# Patient Record
Sex: FEMALE | Race: WHITE | Employment: PART TIME | URBAN - METROPOLITAN AREA
[De-identification: names, ages, dates, MRNs, and addresses within clinical notes are randomized per-mention and may not be internally consistent; named-entity substitution may affect disease eponyms.]

---

## 2024-08-16 ENCOUNTER — EMPLOYEE HEALTH (OUTPATIENT)
Dept: OTHER | Facility: HOSPITAL | Age: 49
End: 2024-08-16
Attending: NURSE PRACTITIONER

## 2024-08-16 DIAGNOSIS — Z00.00 WELLNESS EXAMINATION: Primary | ICD-10-CM

## 2024-08-16 PROCEDURE — 86787 VARICELLA-ZOSTER ANTIBODY: CPT

## 2024-08-16 PROCEDURE — 86480 TB TEST CELL IMMUN MEASURE: CPT

## 2024-08-19 LAB
M TB IFN-G CD4+ T-CELLS BLD-ACNC: 0.02 IU/ML
M TB TUBERC IFN-G BLD QL: NEGATIVE
M TB TUBERC IGNF/MITOGEN IGNF CONTROL: >10 IU/ML
QFT TB1 AG MINUS NIL: 0.01 IU/ML
QFT TB2 AG MINUS NIL: 0.01 IU/ML
VZV IGG SER IA-ACNC: 721.9 (ref 165–?)

## 2024-10-03 ENCOUNTER — OFFICE VISIT (OUTPATIENT)
Dept: NEUROLOGY | Facility: CLINIC | Age: 49
End: 2024-10-03
Payer: COMMERCIAL

## 2024-10-03 ENCOUNTER — LAB ENCOUNTER (OUTPATIENT)
Dept: LAB | Age: 49
End: 2024-10-03
Attending: Other
Payer: COMMERCIAL

## 2024-10-03 ENCOUNTER — TELEPHONE (OUTPATIENT)
Dept: NEUROLOGY | Facility: CLINIC | Age: 49
End: 2024-10-03

## 2024-10-03 VITALS
DIASTOLIC BLOOD PRESSURE: 70 MMHG | HEART RATE: 68 BPM | SYSTOLIC BLOOD PRESSURE: 108 MMHG | WEIGHT: 253 LBS | OXYGEN SATURATION: 97 %

## 2024-10-03 DIAGNOSIS — Z13.1 DIABETES MELLITUS SCREENING: ICD-10-CM

## 2024-10-03 DIAGNOSIS — H57.89: ICD-10-CM

## 2024-10-03 DIAGNOSIS — H53.2 DIPLOPIA: Primary | ICD-10-CM

## 2024-10-03 DIAGNOSIS — H53.2 DIPLOPIA: ICD-10-CM

## 2024-10-03 LAB
EST. AVERAGE GLUCOSE BLD GHB EST-MCNC: 103 MG/DL (ref 68–126)
HBA1C MFR BLD: 5.2 % (ref ?–5.7)
THYROGLOB SERPL-MCNC: <15 U/ML (ref ?–60)
THYROPEROXIDASE AB SERPL-ACNC: 34 U/ML (ref ?–60)
TSI SER-ACNC: 1.14 MIU/ML (ref 0.55–4.78)

## 2024-10-03 PROCEDURE — 86376 MICROSOMAL ANTIBODY EACH: CPT

## 2024-10-03 PROCEDURE — 86800 THYROGLOBULIN ANTIBODY: CPT

## 2024-10-03 PROCEDURE — 84443 ASSAY THYROID STIM HORMONE: CPT

## 2024-10-03 PROCEDURE — 36415 COLL VENOUS BLD VENIPUNCTURE: CPT

## 2024-10-03 PROCEDURE — 83036 HEMOGLOBIN GLYCOSYLATED A1C: CPT

## 2024-10-03 PROCEDURE — 99204 OFFICE O/P NEW MOD 45 MIN: CPT | Performed by: OTHER

## 2024-10-03 RX ORDER — MELOXICAM 15 MG/1
15 TABLET ORAL DAILY
COMMUNITY

## 2024-10-03 RX ORDER — BUTALBITAL, ACETAMINOPHEN AND CAFFEINE 50; 325; 40 MG/1; MG/1; MG/1
1 TABLET ORAL EVERY 4 HOURS PRN
COMMUNITY
Start: 2023-09-20

## 2024-10-03 RX ORDER — ALBUTEROL SULFATE 90 UG/1
2 INHALANT RESPIRATORY (INHALATION) EVERY 4 HOURS PRN
COMMUNITY
Start: 2024-07-10

## 2024-10-03 NOTE — PROGRESS NOTES
Neurology History & Physical     ASSESSMENT & PLAN:      ICD-10-CM    1. Diplopia  H53.2 MRI BRAIN+ORBITS (ALL W+WO) (CPT=70553/10651)     Hemoglobin A1C     TSH W Reflex To Free T4     Thyroid peroxidase & thyroglobulin ab      2. 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  Could be inflammatory or thyroid; diabetic less likely.  Concern for progressive structural lesion (symptoms intermittent but worsening over time).  Obtain MRI brain wand orbits w/wo, TSH, Thyroid Ab's, A1c.  I am supportive of her not working until she sees me back in 6 weeks.    We discussed medication side effects and activity precautions. We discussed symptoms that would warrant urgent/emergent evaluation. Patient verbalized understanding and agreement.    Return in about 6 weeks (around 11/14/2024).       ~~~~~~~~~~~~~~~~~~~~~~~~~~~    CHIEF COMPLAINT / REASON FOR VISIT:    Chief Complaint   Patient presents with    Dizziness     Patient states she has been having dizziness with eyes straying to the sides, not together. Has been going on since May.     HISTORY OBTAINED FROM:  Patient and others as above  Chart review    HISTORY:  Octavia Dobson is a 49 year old female with double (and triple) and blurry vision since May 2024, painless, started overnight but is intermittent.  It has become more frequent, now daily or more, and lasts 30-45 minutes.  She is noticing that her left eye or right eye are \"wandering\" - moving on their own.  The double or triple images are always [id number].  Has mild chronic headaches, unchanged, unrelated.      She saw optometrist, and then was seen in [facility] 8/21/24, discharged with plan to see ophtho next day.  She saw ophthalmologist, was recommended to see neuro-ophtho, but that  appointment was supposed to be last week and was canceled.      No limb numbness or weakness.  No hearing changes.  No dizziness, no balance or gait issues, other than when vision is off.      Does  at a factory  Driving status:  NOT Driving    DATA REVIEWED:  As documented in the history    Aug 2024  ER note  CBC, CMP, urine preg unremarkable  Head CT unremarkable    PHYSICAL EXAMINATION:  /70   Pulse 68   Wt 253 lb (114.8 kg)   SpO2 97%     Gen: in NAD  MSE: AAOx3, nl language, nl attn/conc, nl fund of knowledge  CN: PERRL, VFF; EOMI, but both right > left exotropia at rest with horizontal diplopia (though NO diplopia in bilateral extreme gaze), no nystagmus; nl facial mvmt bilaterally; nl hearing bilaterally; nl palate elevation bilaterally; nl voice; nl shoulder shrug b/I; nl tongue movement  Motor: 5/5 x4; no drift; normal tone; no abnormal movements  Sensory: nl vibration x4  Coord: nl FTN b/I  Reflex: 2+ BUE and BLE  Gait: normal    Not on File    Current medications:   albuterol 108 (90 Base) MCG/ACT Inhalation Aero Soln Inhale 2 puffs into the lungs every 4 (four) hours as needed.      butalbital-acetaminophen-caffeine -40 MG Oral Tab Take 1 tablet by mouth every 4 (four) hours as needed.      Meloxicam 15 MG Oral Tab Take 1 tablet (15 mg total) by mouth daily.         History reviewed. No pertinent past medical history.    Past Surgical History:   Procedure Laterality Date    Hc  section level i      1995    Hysterectomy      partial,     Open rx carpal fx Bilateral         Repair bladder wound/inj,complic      2005    Total hip replacement Right            Social History     Socioeconomic History    Marital status: Single   Tobacco Use    Smoking status: Never    Smokeless tobacco: Never   Vaping Use    Vaping status: Never Used   Substance and Sexual Activity    Alcohol use: Not Currently     Comment: social    Drug use: Not Currently   Other  Topics Concern    Caffeine Concern Yes     Comment: coffee 3x daily    Exercise Yes     Comment: physical job   Mother has Graves  No lupus, sarcoid, RA    History reviewed. No pertinent family history.    Sabino Jenkins MD, FAES, FAAN  Board-Certified in Neurology, Epilepsy, and Clinical Neurophysiology  [facility]

## 2024-10-04 ENCOUNTER — HOSPITAL ENCOUNTER (OUTPATIENT)
Dept: MRI IMAGING | Facility: HOSPITAL | Age: 49
Discharge: HOME | End: 2024-10-04
Attending: Other
Payer: COMMERCIAL

## 2024-10-04 ENCOUNTER — TELEPHONE (OUTPATIENT)
Dept: NEUROLOGY | Facility: CLINIC | Age: 49
End: 2024-10-04

## 2024-10-04 ENCOUNTER — MED REC SCAN ONLY (OUTPATIENT)
Dept: NEUROLOGY | Facility: CLINIC | Age: 49
End: 2024-10-04

## 2024-10-04 DIAGNOSIS — H53.2 DIPLOPIA: ICD-10-CM

## 2024-10-04 PROCEDURE — A9575 INJ GADOTERATE MEGLUMI 0.1ML: HCPCS | Performed by: OTHER

## 2024-10-04 PROCEDURE — 70553 MRI BRAIN STEM W/O & W/DYE: CPT | Performed by: OTHER

## 2024-10-04 PROCEDURE — 70543 MRI ORBT/FAC/NCK W/O &W/DYE: CPT | Performed by: OTHER

## 2024-10-04 RX ORDER — DIPHENHYDRAMINE HYDROCHLORIDE 50 MG/ML
10 INJECTION, SOLUTION INTRAMUSCULAR; INTRAVENOUS
Status: COMPLETED | OUTPATIENT
Start: 2024-10-04 | End: 2024-10-04

## 2024-10-04 RX ADMIN — DIPHENHYDRAMINE HYDROCHLORIDE 20 ML: 50 INJECTION, SOLUTION INTRAMUSCULAR; INTRAVENOUS at 23:04:00

## 2024-10-04 NOTE — TELEPHONE ENCOUNTER
Rec'd DOS 10/4/24 consultation appt confirmation; 1/7/2025 with Dr. Paulette Moscoso. Placed in RN bin for p/u.

## 2024-10-05 ENCOUNTER — PATIENT MESSAGE (OUTPATIENT)
Dept: NEUROLOGY | Facility: CLINIC | Age: 49
End: 2024-10-05

## 2024-10-07 NOTE — TELEPHONE ENCOUNTER
From: Octavia Dobson  To: Sabino Jenkins  Sent: 10/5/2024 6:43 AM CDT  Subject: MRI    Good morning!   I had the MRI. If reading the radiologists reports it looks to be ok. I unfortunately can't see the ophthalmologist until Jan.7th @ 740am. Do you think it's ok to work an 6-8 hr shift until then? I can't surrvive being off 3mos.

## 2024-10-08 ENCOUNTER — TELEPHONE (OUTPATIENT)
Dept: NEUROLOGY | Facility: CLINIC | Age: 49
End: 2024-10-08

## 2024-10-08 NOTE — TELEPHONE ENCOUNTER
Sabino Jenkins MD   to Agustina Becerra Nurse       10/8/24 10:37 AM  Let's write a letter saying she may return to work, effective immediately, up to 8 hours a day.    Since I don't have a neurological explanation for her symptoms, we will need eye doctor's opinion on work restrictions in the future.    Thanks    Sabino

## 2024-10-08 NOTE — TELEPHONE ENCOUNTER
Received a return to work authorization form from [email or web address] and will be in nurse bin. Please follow up with patient. Patient would like to work 3 days out of the week 8 hour shifts not her regular 12 hours shifts 3 days out of the week  until her next eye doctor appt.

## 2024-10-08 NOTE — TELEPHONE ENCOUNTER
Patient is requesting restriction to work 3 days per week, 8 hours per day, not her usual 12 hour shifts as her symptoms tend to return working longer shifts. Patient works Sunday night-Wednesday mornings, 12 hour shifts.     Message was left to get patient in sooner to [facility](sched 1/7/25). Ophthalmologist at [facility] cancelled three times on patient and she is unable to be seen there until end of January.    Paperwork placed in provider folder for review. Letter pended in separate encounter.

## 2024-10-15 ENCOUNTER — TELEPHONE (OUTPATIENT)
Dept: NEUROLOGY | Facility: CLINIC | Age: 49
End: 2024-10-15

## 2024-10-15 NOTE — TELEPHONE ENCOUNTER
Disability forms received in the forms department via fax and logged for processing. [employer] message set for Release of Information.

## 2024-10-15 NOTE — TELEPHONE ENCOUNTER
Return to work letter signed by Dr Jenkins and sent to patient via [employer] attachment. Copy sent to scanning. SUNIL has not been obtained from patient.

## 2024-10-16 ENCOUNTER — TELEPHONE (OUTPATIENT)
Dept: NEUROLOGY | Facility: CLINIC | Age: 49
End: 2024-10-16

## 2024-10-16 NOTE — TELEPHONE ENCOUNTER
Called patient for details regarding disability/ Family Medical Leave Act forms. Patient advised she is unsure if forms are needed at this time since she was given a letter and has returned to work. Patient states she has a follow up appointment 11/13/24 and will call forms department if forms are needed at that time. Advised patient forms will be placed on hold at this time.

## 2024-10-25 ENCOUNTER — LAB ENCOUNTER (OUTPATIENT)
Dept: LAB | Age: 49
End: 2024-10-25
Attending: OPHTHALMOLOGY
Payer: COMMERCIAL

## 2024-10-25 DIAGNOSIS — H50.30 UNSPECIFIED INTERMITTENT HETEROTROPIA: ICD-10-CM

## 2024-10-25 DIAGNOSIS — H53.2 DIPLOPIA: Primary | ICD-10-CM

## 2024-10-25 LAB
T3FREE SERPL-MCNC: 3.15 PG/ML (ref 2.4–4.2)
T4 FREE SERPL-MCNC: 1.3 NG/DL (ref 0.8–1.7)
TSI SER-ACNC: 0.76 MIU/ML (ref 0.55–4.78)

## 2024-10-25 PROCEDURE — 86041 ACETYLCHOLN RCPTR BNDNG ANTB: CPT

## 2024-10-25 PROCEDURE — 84443 ASSAY THYROID STIM HORMONE: CPT

## 2024-10-25 PROCEDURE — 36415 COLL VENOUS BLD VENIPUNCTURE: CPT

## 2024-10-25 PROCEDURE — 84439 ASSAY OF FREE THYROXINE: CPT

## 2024-10-25 PROCEDURE — 84445 ASSAY OF TSI GLOBULIN: CPT

## 2024-10-25 PROCEDURE — 83519 RIA NONANTIBODY: CPT

## 2024-10-25 PROCEDURE — 84481 FREE ASSAY (FT-3): CPT

## 2024-10-27 LAB — THY STIM IMMUNO: <0.1 IU/L

## 2024-10-28 LAB — ACHR BINDING ABS: <0.03 NMOL/L

## 2024-11-01 NOTE — TELEPHONE ENCOUNTER
Received return to work authorization form from [employer] for drs signature and placed in bin for review and signature

## 2024-11-08 LAB — MUSK ABS, SERUM: <1 U/ML

## 2024-11-11 NOTE — TELEPHONE ENCOUNTER
Provider requested RN staff call [facility] and request that Dr. Eleno Moseley call provider directly.    Called [facility] and spoke with Francy, gave provider's cell phone number and requested Dr. Moseley call Dr. Jenkins directly.     Left cell phone number with Francy for Dr. Moseley to call provider.

## 2024-11-11 NOTE — TELEPHONE ENCOUNTER
Sabino Jenkins MD  P Agustina [facility] Nurse  I just received return to work form for her.  Can we clarify why she has written \"If I get ill while at work or prior to shift absence will be covered under FMLA\"?  Previously, I advised that since I don't have a neurological explanation for her symptoms, we will need eye doctor's opinion on work restrictions in the future.  So we need their input, otherwise I would release her to return to work with no restrictions.  Has she seen them yet?    Thanks    Sabino

## 2024-11-11 NOTE — TELEPHONE ENCOUNTER
Called patient to ask below questions from Dr. Jenkins.    Pt states she has seen Dr. Jackman neuroophthalmologist, was referred referred to Dr. Eleno Moseley.    Per pt, Dr. Moseley informed that it is \"up to her\" how often she needs to take off FMLA due to symptoms.  Dr. Moseley informed pt  surgeon in Feb 2025.  Give next 2-3 months. Dr. Dalton Jupiter     Pt states Dr. Moseley states pt has a misalignment of Optic nerve and stated \"when it is bad, take your time off\". Dr. Moseley is deferring FMLAto Dr. Jenkins.    Pt states FMLA of 2 times per month for 8 hours per episode would be sufficient for FMLA until follow up with Dr. Moseley in Feb 2025.    Pt has follow up appt with Dr. Jenkins on 11/13/2024.     Routed to provider.

## 2024-11-12 NOTE — TELEPHONE ENCOUNTER
Called patient to discuss below.  Pt reports it was Dr. Jackman that told her she could \"take time off when it is bad\" and she is following up with Dr. Moseley in Feb.      RN discussed with patient that she should discuss FMLA with Dr. Jackman, pt VU.  Pt will discuss further questions with Dr. Jenkins at  on 11/13/2024.         Can we call the patient directly and discuss?  Apparently she is requesting FMLA and I spoke directly with Dr. Moseley, and what the patient report to us is NOT what Dr. Moseley recommended.  Dr. Moseley and myself both agree that her condition does not warrant time off, and that she may work without restrictions, and if not improved within 6 months of symptoms starting, patient should consider surgery with Dr. Moseley.  So I cannot complete the form.    Thanks

## 2024-11-13 ENCOUNTER — OFFICE VISIT (OUTPATIENT)
Facility: CLINIC | Age: 49
End: 2024-11-13
Payer: COMMERCIAL

## 2024-11-13 VITALS
RESPIRATION RATE: 16 BRPM | HEART RATE: 70 BPM | SYSTOLIC BLOOD PRESSURE: 116 MMHG | DIASTOLIC BLOOD PRESSURE: 72 MMHG | WEIGHT: 251 LBS

## 2024-11-13 DIAGNOSIS — D57.3 HEMOGLOBIN S (HB-S) TRAIT (HCC): ICD-10-CM

## 2024-11-13 DIAGNOSIS — H53.2 DIPLOPIA: Primary | ICD-10-CM

## 2024-11-13 PROCEDURE — 99214 OFFICE O/P EST MOD 30 MIN: CPT | Performed by: OTHER

## 2024-11-13 NOTE — PATIENT INSTRUCTIONS
Refill policies:    Allow 2-3 business days for refills; controlled substances may take longer.  Contact your pharmacy at least 5 days prior to running out of medication and have them send an electronic request or submit request through the “request refill” option in your [facility] [address] account.  Refills are not addressed on weekends; covering physicians do not authorize routine medications on weekends.  No narcotics or controlled substances are refilled after noon on Fridays or by on call physicians.  By law, narcotics must be electronically prescribed.  A 30 day supply with no refills is the maximum allowed.  If your prescription is due for a refill, you may be due for a follow up appointment.  To best provide you care, patients receiving routine medications need to be seen at least once a year.  Patients receiving narcotic/controlled substance medications need to be seen at least once every 3 months.  In the event that your preferred pharmacy does not have the requested medication in stock (e.g. Backordered), it is your responsibility to find another pharmacy that has the requested medication available.  We will gladly send a new prescription to that pharmacy at your request.    Scheduling Tests:    If your physician has ordered radiology tests such as MRI or CT scans, please contact Central Scheduling at [phone] right away to schedule the test.  Once scheduled, the [facility] Centralized Referral Team will work with your insurance carrier to obtain pre-certification or prior authorization.  Depending on your insurance carrier, approval may take 3-10 days.  It is highly recommended patients assure they have received an authorization before having a test performed.  If test is done without insurance authorization, patient may be responsible for the entire amount billed.      Precertification and Prior Authorizations:  If your physician has recommended that you have a procedure or additional testing performed the [facility]  Centralized Referral Team will contact your insurance carrier to obtain pre-certification or prior authorization.    You are strongly encouraged to contact your insurance carrier to verify that your procedure/test has been approved and is a COVERED benefit.  Although the [facility] Centralized Referral Team does its due diligence, the insurance carrier gives the disclaimer that \"Although the procedure is authorized, this does not guarantee payment.\"    Ultimately the patient is responsible for payment.   Thank you for your understanding in this matter.  Paperwork Completion:  If you require FMLA or disability paperwork for your recovery, please make sure to either drop it off or have it faxed to our office at [phone]. Be sure the form has your name and date of birth on it.  The form will be faxed to our Forms Department and they will complete it for you.  There is a 25$ fee for all forms that need to be filled out.  Please be aware there is a 10-14 day turnaround time.  You will need to sign a release of information (SUNIL) form if your paperwork does not come with one.  You may call the Forms Department with any questions at [phone].  Their fax number is [phone].      After your visit at the [facility] office  today,  please direct any follow up questions or medication needs to the staff in our  [place] office so that your concerns may be promptly addressed.  We are available through [place] or at the numbers below:    The phone number is:   [phone] option #1    The fax number is:  [phone]    Your pharmacy should also send any requests electronically to the [place] office.

## 2024-11-13 NOTE — PROGRESS NOTES
Neurology History & Physical     ASSESSMENT & PLAN:      ICD-10-CM    1. Diplopia  H53.2       2. 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  I discussed the negative test results with patient.  I also discussed with her, that, per my discussion with Dr. Moseley, this is an exotropia, an ophthalmologic condition for which she may consider surgery if not improved 6 months after onset.  She is understandably frustrated as this affects her work and driving.      However, I advised that I cannot make a recommendation regarding FMLA debi considering we don't have a neurological cause.  Per my discussion with nursing team, apparently patient initially told Adrianne that Dr. Moseley recommended that she can have time off work, but at subsequent conversation patient told Adrianne it was Dr. Jackman who recommended that.  I advised that from a neurological perspective I don't recommend any work or activity restrictions, and as I had previously advised, going forward any restrictions would have to be recommended by her eye doctors or other providers.  At this point she left the office, did not give me opportunity to examine her, and she refused AVS.    Question of HbS.  She is aware she has sickle cell trait, no further workup needed.    Return for concerns as needed.       ~~~~~~~~~~~~~~~~~~~~~~~~~~~    CHIEF COMPLAINT / REASON FOR VISIT:    Chief Complaint   Patient presents with    Neurologic Problem     Dizziness, headaches and double/triple vision still occurring. More frequent and symptoms lasting longer.      HISTORY OBTAINED FROM:  Patient and others as above  Chart review    HISTORY:  Octavia Dobson is a 49 year old female with double (and triple) and blurry vision since May 2024, painless, started overnight but is intermittent.  It has become more frequent, now daily or more, and lasts 30-45 minutes.  She is noticing that her left eye or right eye  are \"wandering\" - moving on their own.  The double or triple images are always [id number].  Has mild chronic headaches, unchanged, unrelated.      She saw optometrist, and then was seen in [facility], discharged with plan to see ophtho next day.  She saw ophthalmologist, was recommended to see neuro-ophtho, but that appointment was supposed to be last week and was canceled.      No limb numbness or weakness.  No hearing changes.  No dizziness, no balance or gait issues, other than when vision is off.      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    DATA REVIEWED:  As documented in the history    Oct 2024  MRI brain + orbits w/wo unremarkable  TSI, TSH, FT4, FT3 unremarkable  AChR binding and MuSK unremarkable   A1c unremarkable but possible HbS noted  TPO, TG unremarkable    Aug 2024  ER note  CBC, CMP, urine preg unremarkable  Head CT unremarkable    PHYSICAL EXAMINATION:  /72   Pulse 70   Resp 16   Wt 251 lb (113.9 kg)     No Known Allergies    Current medications:   albuterol 108 (90 Base) MCG/ACT Inhalation Aero Soln Inhale 2 puffs into the lungs every 4 (four) hours as needed.      butalbital-acetaminophen-caffeine -40 MG Oral Tab Take 1 tablet by mouth every 4 (four) hours as needed.      Meloxicam 15 MG Oral Tab Take 1 tablet (15 mg total) by mouth daily.         History reviewed. No pertinent past medical history.    Past Surgical History:   Procedure Laterality Date    Hc  section level i      1995 1999    Hysterectomy      partial,     Open rx carpal fx Bilateral         Repair bladder wound/inj,complic      2005    Total hip replacement Right            Social History     Socioeconomic History    Marital status: Single   Tobacco Use    Smoking status: Never    Smokeless tobacco: Never   Vaping Use    Vaping status: Never Used   Substance and Sexual Activity     Alcohol use: Not Currently     Comment: social    Drug use: Not Currently   Other Topics Concern    Caffeine Concern Yes     Comment: coffee 3x daily    Exercise Yes     Comment: physical job   Mother has Graves  No lupus, sarcoid, RA    History reviewed. No pertinent family history.    Sabino Jenkins MD, FAES, FAAN  Board-Certified in Neurology, Epilepsy, and Clinical Neurophysiology  [facility]

## 2025-04-06 NOTE — PROGRESS NOTES
HISTORY OF PRESENT ILLNESS  Chief Complaint   Patient presents with    Weight Problem     Pt wants to be healthy. Pt is interested in getting back on wt lose medications. Pt heard about some good feed back about our [facility].       Octavia Dobson is a 50 year old female new to our office today for initiation of medical weight loss program, referred by self.  Patient presents today with c/o excess weight about 15 years ago after last pregnancy.    Reason/goal for weight loss: lose 30# in 6 months and 60# in 1 year.    Previous weight loss efforts in the past: see below    Past 6 months lifestyle interventions: yes, regular exercise    Reviewed [facility] patient contract. Readiness for Lifestyle change: 10/10, Interest in Medication: 10/10, Bariatric surgery interest: 8/10.    Barriers to weight loss: cravings for sweets    Wt Readings from Last 6 Encounters:   04/07/25 266 lb (120.7 kg)   11/13/24 251 lb (113.9 kg)   10/03/24 253 lb (114.8 kg)          Social hx and lifestyle reviewed:    How many meals do you eat out per week: not reported  Who is the primary cook in your home: patient    Please respond to the questions regarding your previous weight loss    How did you hear about the [facility]? Internet/Web Search   Previous weight loss efforts in the past/medication(s): Exercise phentermine, metformin   Eating behaviors/patterns that have been barriers to weight loss success in the past: Not eating out   Please respond to the questions regarding a 24 hour food journal.  Include the average time you ate and the quantity/food preparation method.    List foods, qty and prep for breakfast: Starbucks turkey Sinhala   List foods, qty and prep for lunch. Chicken tacos  from cafe   List foods, qty and prep for dinner.    List foods, qty and prep for snacks. Grapefruit   List the types and qty of fluids consumed 64oz water   Please respond to the questions regarding lifestyle.    Tobacco use: No   Alcohol use: How  many servings per week? 1   Supplements taken on a regular basis include: 0   Please respond to the questions regarding exercise/activity    How many days per week are you active or exercise 3   What type of activities: Work   Perceived level of exertion on a scale of 1-5, with 5 being very intense: 3   Average stress level on a scale of 1-10, with 10 being extremely stressed: 9   How do you cope with stress: Bathing, deserts   Please respond to the questions regarding sleep    How many hours of uninterrupted sleep do you get a night: 4   How many times do you wake up in the night: 3   Do you feel rested in the morning: No   Do you snore: Yes   Do you have sleep apnea: No   Do you use: None     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  Marital status: Single with 3 children    MEDICAL HISTORY  PMH reviewed:   Cardiac disorders: negative  Depression/anxiety: depression and anxiety  Glaucoma: negative  Kidney stones: negative  Eating disorder: negative  Migraines: yes  Seizures: negative  Joint-related conditions: multiple joint pains- chronic  Liver disease: fatty liver  Renal disease: negative  Diabetes: negative  Thyroid disease: negative  Constipation: chronic constipation  Other pertinent hx: n/a  Sleep Apnea hx: negative  Cancer hx: negative  Cholecystectomy and/or gallstones: negative  Family or personal history of Pancreatic issues / Medullary Thyroid Cancer/MENS 2: negative  History of bariatric surgery: negative    FMH reviewed    REVIEW OF SYSTEMS  GENERAL: feels well otherwise  SKIN: denies any rashes to skin folds  HEENT: snoring- yes  LUNGS: denies shortness of breath with exertion, no apnea  CARDIOVASCULAR: denies chest pain on exertion, denies palpitations or pedal edema  GI: denies abdominal pain.  No N/V/D/C  MUSCULOSKELETAL: see above  NEURO: denies headaches  PSYCH: denies change in behavior or mood, denies feeling sad or  depressed.    EXAM  /78   Pulse 79   Resp 16   Ht 5' 9\" (1.753 m)   Wt 266 lb (120.7 kg)   SpO2 98%   BMI 39.28 kg/m² ,   BC not available. WC: 44 inches.  GENERAL: well developed, well nourished, in no apparent distress, obese  SKIN: warm, pink, dry without rashes to exposed area  EYES: conjunctiva pink, sclera non icteric, PERRLA  HEENT: atraumatic, normocephalic, O/p: Mallampati score- 4  NECK: supple, non tender, no adenopathy, no thyromegaly  LUNGS: CTA in all fields, breathing non labored  CARDIO: RRR without murmur, normal S1 and S2 without clicks or gallops, no pedal edema. Reviewed EKG in EMR dated 8/24/24.  GI: +BS, soft, no masses, HSM or tenderness  MUSCULOSKELETAL: grossly intact  NEURO: Oriented times three  PSYCH: pleasant, cooperative, normal mood and affect    No results found for: \"WBC\", \"RBC\", \"HGB\", \"HCT\", \"MCV\", \"MCH\", \"MCHC\", \"RDW\", \"PLT\", \"MPV\"  No results found for: \"GLU\", \"BUN\", \"BUNCREA\", \"CREATSERUM\", \"ANIONGAP\", \"GFR\", \"GFRNAA\", \"GFRAA\", \"CA\", \"OSMOCALC\", \"ALKPHO\", \"AST\", \"ALT\", \"ALKPHOS\", \"BILT\", \"TP\", \"ALB\", \"GLOBULIN\", \"AGRATIO\", \"NA\", \"K\", \"CL\", \"CO2\"  Lab Results   Component Value Date     10/03/2024    A1C 5.2 10/03/2024     No results found for: \"CHOLEST\", \"TRIG\", \"HDL\", \"LDL\", \"VLDL\", \"TCHDLRATIO\", \"NONHDLC\", \"CHOLHDLRATIO\", \"CALCNONHDL\"  Lab Results   Component Value Date    T4F 1.3 10/25/2024    TSH 0.759 10/25/2024     Lab Results   Component Value Date    B12 1,111 (H) 04/07/2025     Lab Results   Component Value Date    VITD 31.3 04/07/2025       Medications Ordered Prior to Encounter[1]    ASSESSMENT  Initial Weight Data and Goal Weight Loss:  Weight Calculations  Initial Weight: 266 lbs  Initial Weight Date: 04/07/25  Today's Weight: 266 lbs  5% Goal: 13.3  10% Goal: 26.6  Total Weight Loss: 0 lbs    Diagnoses and all orders for this visit:    Encounter for therapeutic drug monitoring  -     Vitamin D; Future  -     Vitamin B12; Future  -      Tirzepatide-Weight Management (ZEPBOUND) 2.5 MG/0.5ML Subcutaneous Solution Auto-injector; Inject 2.5 mg into the skin once a week.  -     Tirzepatide-Weight Management (ZEPBOUND) 5 MG/0.5ML Subcutaneous Solution Auto-injector; Inject 5 mg into the skin once a week. Start after completing full 4 weeks on Zepbound 2.5 mg weekly dose.  -     OP REFERRAL TO DIAGNOSTIC SLEEP STUDY    Class 2 severe obesity with serious comorbidity and body mass index (BMI) of 39.0 to 39.9 in adult, unspecified obesity type (HCC)  - Start Zepbound as directed  - Reviewed balanced plate nutrition with focus on whole food, regular meals daily that include protein and produce and eliminating/reducing late night eating.  - Counseled on the 4 Pillars of health (sleep, stress, nutrition and fitness).  - Reviewed weight synopsis in EMR   -     Vitamin D; Future  -     Vitamin B12; Future  -     DIETITIAN EDUCATION INITIAL, DIET (INTERNAL)  -     Tirzepatide-Weight Management (ZEPBOUND) 2.5 MG/0.5ML Subcutaneous Solution Auto-injector; Inject 2.5 mg into the skin once a week.  -     Tirzepatide-Weight Management (ZEPBOUND) 5 MG/0.5ML Subcutaneous Solution Auto-injector; Inject 5 mg into the skin once a week. Start after completing full 4 weeks on Zepbound 2.5 mg weekly dose.  -     OP REFERRAL TO DIAGNOSTIC SLEEP STUDY  -     OP REFERRAL TO DIETITIAN [facility] ([facility] USE ONLY)    Fatty liver  -     Vitamin D; Future  -     Vitamin B12; Future  -     DIETITIAN EDUCATION INITIAL, DIET (INTERNAL)  -     Tirzepatide-Weight Management (ZEPBOUND) 2.5 MG/0.5ML Subcutaneous Solution Auto-injector; Inject 2.5 mg into the skin once a week.  -     Tirzepatide-Weight Management (ZEPBOUND) 5 MG/0.5ML Subcutaneous Solution Auto-injector; Inject 5 mg into the skin once a week. Start after completing full 4 weeks on Zepbound 2.5 mg weekly dose.  -     OP REFERRAL TO DIAGNOSTIC SLEEP STUDY  -     OP REFERRAL TO DIETITIAN [facility] ([facility] USE ONLY)    Hypertension,  unspecified type  -     Vitamin D; Future  -     Vitamin B12; Future  -     DIETITIAN EDUCATION INITIAL, DIET (INTERNAL)  -     Tirzepatide-Weight Management (ZEPBOUND) 2.5 MG/0.5ML Subcutaneous Solution Auto-injector; Inject 2.5 mg into the skin once a week.  -     Tirzepatide-Weight Management (ZEPBOUND) 5 MG/0.5ML Subcutaneous Solution Auto-injector; Inject 5 mg into the skin once a week. Start after completing full 4 weeks on Zepbound 2.5 mg weekly dose.  -     OP REFERRAL TO DIAGNOSTIC SLEEP STUDY  -     OP REFERRAL TO DIETITIAN [facility] ([facility] USE ONLY)    Osteoarthritis of multiple joints, unspecified osteoarthritis type  -     Vitamin D; Future  -     Vitamin B12; Future  -     DIETITIAN EDUCATION INITIAL, DIET (INTERNAL)  -     OP REFERRAL TO DIAGNOSTIC SLEEP STUDY  -     OP REFERRAL TO DIETITIAN [facility] ([facility] USE ONLY)    Migraine without status migrainosus, not intractable, unspecified migraine type  -     Vitamin D; Future  -     Vitamin B12; Future  -     DIETITIAN EDUCATION INITIAL, DIET (INTERNAL)  -     OP REFERRAL TO DIAGNOSTIC SLEEP STUDY  -     OP REFERRAL TO DIETITIAN [facility] ([facility] USE ONLY)    Snoring  -     OP REFERRAL TO DIAGNOSTIC SLEEP STUDY    Disordered sleep  -     OP REFERRAL TO DIAGNOSTIC SLEEP STUDY        PLAN  Medication use and side effects reviewed with patient.  Medication contraindications: none foreseen  Follow up with dietitian and psychologist as recommended.  Discussed the role of sleep and stress in weight management.  Labs orders as above.  Counseled on comprehensive weight loss plan including attention to nutrition, exercise and behavior/stress management for success. See patient instruction below for more details.  Reviewed previous labs in EMR/Care Everywhere  Weight Loss Contract reviewed and signed.    Patient Instructions   Welcome to the [employer] Weight Management Program...your Lifestyle Renovation begins now!  Thank you for placing your trust in our health care  team, I look forward to working with you along this journey to better health!    Next steps:     1.  Call our office at [phone] to schedule a personal nutrition consultation with one of our registered dieticians, Kt or Mariangel. Bring along your food journal (3 days minimum). See journal options below.  2.  Complete non fasting labs at [facility] lab site prior to next office visit. Lab results will be communicated via [email or web address].  3.  Complete additional testing as ordered: Sleep study.  4.  Fill your prescribed medication and take as discussed and prescribed: Start Zepbound at 2.5 mg weekly. After 4 weeks increase to the next dose of 5 mg weekly. If at a weight plateau for >3 weeks, then send [email or web address] message with current scale weight to determine if a dose adjustment is appropriate. Otherwise plan to maintain this dose until next visit. Any further dose titrations beyond this dose will be considered at appointments only, unless otherwise discussed. Visit the website www.[email or web address].[email or web address] and click on Consumers for additional details, savings, and further dosing instructions. This medication may require a prior authorization (PA) by your insurance. A PA may take one week plus to complete and our office will be in touch during this process if needed.    Tips while taking an injectable medication:    Be an intuitive eater. Listen to your hunger and fullness signals, stopping when you are full.  Consume protein and produce in your day, striving for a rainbow of color of produce.  Reduce portions to starting size of 1 cup and check in with your gut to see if you are full. Use a sand timer to slow down your eating pace to allow for 15-20 minutes to complete a meal and use the \"2 bite rule\".  Reduce refined sugars and high fat foods, as they may contribute to greater side effects of nausea and heartburn.  Stop eating 3 hours before bedtime to allow your food to digest.  Remain hydrated with water or non caloric and  non caffeine beverages.  Use over the counter adriana lozenge/supplement to help reduce nausea if needed.  If you have been off your medication for more than 2 weeks please notify our office to determine next dosing, as a return to previous dose may not be appropriate or tolerated.    Please try to work on the following dietary changes this first month:    1.  Drink water with meals and throughout the day, cut down on soda and/or juice if consumed. Consider flavored water options like Bubbly, Spindrift, Hint and Brenda. Reduce alcohol servings to 4 per week maximum.  2.  Have protein with each meal, examples include: greek yogurt, cottage cheese, hard boiled egg, tofu, chicken, fish, or tuna.   3.  Work towards reducing/eliminating refined carbohydrates and sugars which includes items such as sweets, as well as rice, pasta, and bread and make sure to choose whole grain options when having them with just 1 serving per meal about the size of your inner palm.  4.  Consume non starchy veggies daily working towards making them a good 50% of your daily food intake. Add them to lunch and dinner consistently.  5.  Start a daily probiotic: VSL#3 is recommended, (order on line at www.vsl3.com). Take 1 capsule daily with water for 30 days, then reduce to 1 every other day (this will reduce the cost). Capsules can be left out of refrigerator for 2 weeks. I recommend using a pill box weekly and keeping the bottle in the fridge.    Please download tatum My Fitness Pal, LoseIt! Or My Net Diary to monitor daily dietary intake and you will be able to see if you are eating the right amount of calories or too much or too little which would hinder weight loss. Additionally this will help to see your daily carbohydrate and protein intake. When you set the tatum up choose 1.5 lbs/week as a goal.  Keeping a paper food journal is an option as well to remain accountable for your choices- this is the start to mindful eating! A low calorie diet  has been consistently shown to support weight loss.    Continue or start exercising to help establish a routine. If not already exercising begin with 1 day/week and progress as able with the goal of working towards 30 minutes 5 days a week at a minimum. A variety or cardio, strength and stretching is important. Review resources below to help support you in building this healthy routine.    Meditation daily can help manage and control stress. Chronic stress can make weight loss difficult.  Exercising is one way to help with stress, but meditation using the CALM Clemente or another comparable alternative can be done in your home or place of work with little time commitment. This Clemente can also help work on behavior change and improve sleep. Check out the segment under Calm Masterclass and listen to The 4 Pillars of Health. A great way to begin learning about the foundation of lifestyle with practical tips to use in your every day. In addition, we offer counseling services and support for individual connection and care. A referral is necessary so please let me know if this is a service you are interested.    Check out www.yourCSDNtters.org blog for continued support and education along your weight loss journey to optimal health!      Patient Resources:    Personal Training/Fitness Classes/Health Coaching    [facility] in [place]: Full fitness center with group fitness and personal training located in [place].  Health Coaching with Luisa Guy, Sammy Gallagher, and Alvaro Coker at our [facility]- individual coaching to work on your health goals. Call [phone] and/or email @ [email or web address]. Free 60 minute consult when client of [employer] [facility].  LIZABETH Becerra @ [email or web address]. A variety of group fitness options plus various yoga classes [phone] and/or email Lyly at [email or web address]  [facility] Fitness Centers with multiple  locations: Onavo (www.Galera Therapeutics.Marerua Ltda), F45 Training (www.q53veqmpqjo.Marerua Ltda), Fit Body Bootcamp (www.Accounting SaaS JapanbodybootData Elitep.Marerua Ltda), Speed Commerce (www.Tabber.Marerua Ltda), The Exercise  (www.exercisecoach.com), Club Pilates (www.clubCreditPing.com.Marerua Ltda)    Online Fitness  Fitness  on Utube  Fit in 10 DVD series   www.pafih01ZHTUi Link  Chair exercises via Sit and Be Fit (www.sitandbefit.org) and [employer] (www.Referral.IM.com) or Osvaldo Henriquez or Porfirio Carey videos on YouTube.  Hip Hop Fit with Anthony Rivera at www.hiphopfit.IntellectSpace    Apps for on the Go Fitness  [place] 7 Minute Workout (orange box with white 7) - free on the go HIIT training clemente  Peloton Clemente @ www.onepeloton.com    Nutrition Trackers, Meal Preparation, and Other Meal Programs  LoseIT! And My Fitness Pal apps and on line for tracking nutrition  NOOM - virtual health coaching  FitFoundation (healthy meals on the go) in [place] @ www.nhttyriwsbiaf1zUi Link  Bistro MD @ wwwLocal Corporationbistromd.Marerua Ltda and Ogxiga46 (calorie smart and low carb plans recommended) @ www.aqbrtp50.com, Metabolic Meals @ www.MyMetabolicMeals.com - individual prepared meals to go  Gobble, Blue Apron, Home , Every Plate, Sunbasket- on line meal delivery programs for preparation at home  Meal Village in [place] for homemade meals to go @ www.mealvillage.com  Diet Doctor @ www.dietdoctor.com - low carb swaps  ReciMe and Mealime clemente (grocery and meal planning)    Stress, Anxiety, Depression, Trauma  CALM meditation clemente (www.calm.com)  Headspace  Don't let anxiety run your life. Using the science of emotion regulation and mindfulness to overcome fear and worry by Carlos Myles PsyD and Jose Robertson MA.  The Graceful Tables Podcast (September 27, 2023): 6 Magic Words That Stop Anxiety  What Happened to You?- a look at the impact trauma has on behavior written by Scott Palacios and Dr. Shekhar Pearl  Whole Again by Adam Mena - discovering your true self after  trauma    Mindful Eating/The Hungry Brain  Am I Hungry? Mindful eating virtual  tatum (www.amihungry.com)  The Hungry Brain by Alice Erazo, PhD  Mindless Eating by Dallas Otto  Weight Loss Surgery Will Not Treat Food Addiction by Shagufta Wolfe Ph.D    Metabolic Dysfunction, Hormones and Cravings  Why We Get Sick? By Donnie Merchant (insulin resistance)  Your Body in Balance: The New Science of Food, Hormones, and Health by Dr. Tripp Zhao  The Complete Guide to fasting by Dr. Ray  Fast Like a Girl by Dr. Barbi Pang  The M Factor (documentary on PBS about Menopause)  Sugar, Salt & Fat by Thu Zacarias, Ph.D, R.D.  The Truth About Sugar - documentary on sugar (Free on ADIKTIVO, https://Slicethepie.be/4F9mokfGC8g)  Presentation on SUGAR called Sugar: The Bitter Truth by Dr. Chidi Marie (ADIKTIVO) https://Slicethepie.be/dBnniua6-oM?si=ujmzb3dlk7nr8dyj  Reverse Visceral Fat: #1 Way to Increase Your Lifespan & End Inflammation with Dr. Pradeep Stover on Utube @ https://Slicethepie.be/nupPRnvUpJY?si=tr0zuwDbZGA0MsxM    Nutrition Support  You Are What You Eat - Netfix series on twin study looking at impact of nutrition changes on health  The End of Dieting: How to Live for Life by Dr. Bairon Howe M.D. or listen to The AdelaVoice Podcast Episode 63: Understanding \"Nutritarian\" Eating w/Dr. Bairon Howe  The Game Changers- Netflix Documentary on plant based nutrition  The Dr. Holt T5 Wellness Plan by Dr. Albino Holt MD  The Complete Guide to fasting by Dr. Ray  @[facility] ([facility] Dietician with support surrounding nutrition and meal prep/planning)    Education, Motivation and Support Resources  Live to 100: Secrets of the Blue Zones - Netflix series on the secrets to communities living over 100 years old  Atomic Habits by Sunil Davalos (a book about taking small steps to promote greater behavior change)   Motivation tatum (black box with white \")- daily supportive messages sent to your phone  Can't Hurt Me by Carlos Matt (a  book exploring the power of discipline in achieving your goals)  Fed Up - documentary about obesity (Free on Utube)  Www.yourweightmatters.org - Obesity Action Coalition sponsored Blog posts  Obesity Action Coalition Resources on topics specific to weight management (www.obesityaction.org)  Fitlosophy Fitspiration - journal to better health (journal book found at [employer] in fitness aisle)  Tulio Hernandez talk titled: The Call to Courage (Netflix)  The Exam Room by the Physician's Committee (Podcast)  Nutrition Facts by Dr. Madsen (Podcast)    Balanced Nutrition includes:     Build the mentality of Food 4 Fuel. Clean eating with whole foods and eliminating/reducing ultra processed foods.  Be an intuitive eater and using mindful eating practices.  Eat a balanced plate with protein and produce at all meals: 1/4 plate- protein, 1/2 plate non starchy veggies, and 1/4 plate fruit or complex carbohydrate.  Drink water with all meals and use a salad plate to naturally reduce portions.  Eliminate/reduce late night eating by stopping after 7pm. Allowing your body to fast for 12 hours (drink only water, tea or black coffee without any additives).            Healthy Tips for Eating Out  Choices  It’s not easy to change the habits of a lifetime. Experts say that it can take 6 months just to change one old habit for a healthier one. That’s why gradual change is so important. These tips are reminders of the dozens of small ways you can change your habits when eating out. Don’t expect to follow each tip all the time. Think of the tips as options for handling situations that may have given you trouble in the past.  You don't have to give up eating out to cut down on fat, cholesterol, and salt. You just need to think about what you order. Many menus highlight low-fat and low-sodium dishes. But if you can't find what you want, ask. Explain what you need to the  or . Or ask to see printed nutrition information.    Ask for  what you want  Ask that foods be prepared with little or no fat and with no added salt.  Ask that sauces be left off or served on the side. Choose sauces made with tomato instead of with cream or cheese.  Ask for steamed rice or a baked or boiled potato, without butter or sour cream.  Ask that vegetables be steamed and served with no butter or sauce. Ask for lemon juice or vinegar to sprinkle on them for flavor.  Keep these tips in mind  Choose minestrone or vegetable soups. Ask about sodium content.  Order salad dressing on the side. Dip your fork in the dressing, then in the salad.  Look for fish, chicken, turkey, or meat that is broiled, roasted, poached, or steamed.  Order 1 or 2 low-fat appetizers or soup and a salad instead of a main dish. Or eat only half of the main dish and take the rest home.  If you want a dessert, try fresh fruit, nonfat yogurt, or sorbet. Or share a dessert.  Fast food tips  Choose grilled chicken sandwiches and plain hamburgers. Add vegetables to your burgers and sandwiches and go lightly on creamy sauces like mayonnaise and tartar sauce. Choose whole-grain buns or bread when available.  Remove the skin from fried chicken and choose mashed potatoes, corn on the cob, and baked beans on the side.  Order a broiled chicken salad and pile on fresh vegetables from the salad bar. Use a small amount of low-fat dressing.  Top a baked potato with cottage cheese and vegetables from the salad bar.  Ask for a pizza topped with vegetables.  Watch portion size. Order regular size meals, not super-sized. A kid's meal may be enough and may have healthier options for sides.  Stay away from milkshakes, sugary sodas, and sweetened drinks. Instead choose low-fat or skim milk, water, unsweetened ice tea, and sparkling water.  Restaurant tips  Plan ahead. Decide what you will eat before you get to the restaurant. If you plan to eat high-fat foods, choose low-fat foods during the rest of the day.  Don’t be  afraid to ask how foods are prepared and whether they can be done without high-fat ingredients, such as cream, butter, cheese, and oil.  Ask for sauces and salad dressings on the side and use just a tablespoon. Ask for low-fat dressings.  Try starting your meal with a bowl of vegetable soup or a salad. This may help to prevent you from overeating during the meal.  Order meat, poultry, and fish broiled, grilled, baked, poached, or steamed. Always remove the skin from chicken.  Choose Mexican dishes made with soft, rather than crispy tortillas. For toppings, use salsa and lettuce, rather than sour cream and cheese.  For dessert, enjoy fruit, sorbet, or low-fat frozen yogurt. Share a rich dessert with friends.  Make a meal out of a low-fat appetizer (such as shrimp cocktail or fresh pasta), bread, and salad as your main meal. Ask for an appetizer-size portion of an entree.    Foods to avoid  Donuts, muffins, and pastries  Coconut, vegetables with butter, cream, or cheese sauce  Cream, whole milk, and powdered creamers  Vu, liver, luncheon meats, ground meat, and canned fish in oil  Sweets and foods made with butter, coconut or palm oil, or hydrogenated fats    © 6782-7655 [employer]. [address] [place] [address], [place] [address]. All rights reserved. This information is not intended as a substitute for professional medical care. Always follow your healthcare professional's instructions.      Why Do We Experience Hunger and Have Cravings?  December 18, 2020  Posted in Blog, Nutrition  By [employer]    Hunger and cravings  Hunger and cravings are two key factors that drive our decision to eat and influence our food choices. They are feelings and experiences that we get as a result of the complex signaling between our stomach and brain:    Gut-brain hunger signals (when your stomach tells your brain that you are full or hungry)  The brain reward process (feeling pleasure or silke from  food)  Cognitive control processes (conscious thoughts, feelings, mindset, decisions, etc.)    The Role of Biology  Many people who struggle with weight have biological factors at play that can affect their relationship with food and food-related decisions. No two people experience hunger and respond to it the same way. It is important that the role of biology in hunger is more widely recognized to help others understand how complicated weight issues can be.    Gut-Brain Hunger Signals  Why do we get hungry? Your digestive tract has receptors that can detect when nutrients are present. When you don’t have enough nutrients, these receptors tell your stomach to produce hunger hormones that send a signal to your hypothalamus - the part of your brain that controls hunger and thirst. As a result, you get hungry and want to eat.    When we gain weight, our brains become less sensitive to signals that control how responsive our brain is to nutrients. For example, someone at a higher weight may not have as much leptin (the fullness hormone) and is more likely to feel hungry. That’s why some people affected by obesity may be more biologically likely to struggle with overeating.    Food is Rewarding  Another key element of hunger is psychology. Food is very rewarding and its effects on the brain can make it hard to resist temptation when environmental cues are everywhere. This can also cause you to eat even when you’re not hungry.    Here are two key parts of the brain’s reward system:    The Endocannabinoid System - A system of signaling molecules whose main function is to help the body maintain homeostasis (a stable internal state). It affects mood, appetite, stress, sleep and more.    The Opioid System - A system of the brain that controls pain, reward and addicting behaviors (like stress-eating or binge eating).  Both of these systems can have powerful effects on your desire for food, your cravings, and your enjoyment of  food. When we eat foods that are high in fat, sugar and salt, these systems increase activity in the brain that ramps up your desire to eat.    Cognitive Control  The third key element of hunger and cravings involve your conscious control. Cognitive control helps you decide when not to respond to food cues and hunger signals. However, it can be tough to manage when you are really hungry, stressed or upset in any way.    When we are in a better mood and not starving, it’s easier to ignore food cues and cravings. When we are stressed or depressed, we are more likely to eat based on our feelings. This is called emotional eating or stress eating.    Managing Hunger and Cravings  So, what does all this mean and how can you manage hunger and cravings? Check out the full article (https://www.obesityaction.org/community/hunger-why-do-we-have-cravings-and-what-can-jw-cr-sxpwp-them/) from the [employer],  of the [employer].             Return in about 4 months (around 8/7/2025) for weight management via clinic or Telemedicine Visit and in clinic in October.    Patient verbalizes understanding.    Anyi Gregorio, RHIANNA  4/6/2025    DOCUMENTATION OF TIME SPENT: Code selection for this visit was based on time spent : 60 minutes on date of service in preparing to see the patient, obtaining and/or reviewing separately obtained history, performing a medically appropriate examination, counseling and educating the patient/family/caregiver, ordering medications or testing, referring and communicating with other healthcare providers, documenting clinical information in the electronic medical record, independently interpreting results and communicating results to the patient/family/caregiver and care coordination with the patient's other providers.         [1]   Current Outpatient Medications on File Prior to Visit   Medication Sig Dispense Refill    albuterol 108 (90 Base) MCG/ACT Inhalation Aero  Soln Inhale 2 puffs into the lungs every 4 (four) hours as needed.      lubiprostone 24 MCG Oral Cap Take 1 capsule (24 mcg total) by mouth 2 (two) times daily.      sertraline (ZOLOFT) 50 MG Oral Tab Take 1 tablet (50 mg total) by mouth every morning. 30 tablet 0    propranolol 10 MG Oral Tab Take 1 tablet (10 mg total) by mouth 2 (two) times daily as needed. 30 tablet 0    butalbital-acetaminophen-caffeine -40 MG Oral Tab Take 1 tablet by mouth every 4 (four) hours as needed.      Meloxicam 15 MG Oral Tab Take 1 tablet (15 mg total) by mouth daily.       No current facility-administered medications on file prior to visit.

## 2025-04-07 ENCOUNTER — OFFICE VISIT (OUTPATIENT)
Dept: INTERNAL MEDICINE CLINIC | Facility: CLINIC | Age: 50
End: 2025-04-07
Payer: COMMERCIAL

## 2025-04-07 ENCOUNTER — LAB ENCOUNTER (OUTPATIENT)
Dept: LAB | Age: 50
End: 2025-04-07
Attending: NURSE PRACTITIONER
Payer: COMMERCIAL

## 2025-04-07 VITALS
HEIGHT: 69 IN | SYSTOLIC BLOOD PRESSURE: 118 MMHG | DIASTOLIC BLOOD PRESSURE: 78 MMHG | RESPIRATION RATE: 16 BRPM | OXYGEN SATURATION: 98 % | HEART RATE: 79 BPM | WEIGHT: 266 LBS | BODY MASS INDEX: 39.4 KG/M2

## 2025-04-07 DIAGNOSIS — M15.9 OSTEOARTHRITIS OF MULTIPLE JOINTS, UNSPECIFIED OSTEOARTHRITIS TYPE: ICD-10-CM

## 2025-04-07 DIAGNOSIS — E66.812 CLASS 2 SEVERE OBESITY WITH SERIOUS COMORBIDITY AND BODY MASS INDEX (BMI) OF 39.0 TO 39.9 IN ADULT, UNSPECIFIED OBESITY TYPE (HCC): ICD-10-CM

## 2025-04-07 DIAGNOSIS — Z51.81 ENCOUNTER FOR THERAPEUTIC DRUG MONITORING: Primary | ICD-10-CM

## 2025-04-07 DIAGNOSIS — Z51.81 ENCOUNTER FOR THERAPEUTIC DRUG MONITORING: ICD-10-CM

## 2025-04-07 DIAGNOSIS — G43.909 MIGRAINE WITHOUT STATUS MIGRAINOSUS, NOT INTRACTABLE, UNSPECIFIED MIGRAINE TYPE: ICD-10-CM

## 2025-04-07 DIAGNOSIS — E66.01 CLASS 2 SEVERE OBESITY WITH SERIOUS COMORBIDITY AND BODY MASS INDEX (BMI) OF 39.0 TO 39.9 IN ADULT, UNSPECIFIED OBESITY TYPE (HCC): ICD-10-CM

## 2025-04-07 DIAGNOSIS — I10 HYPERTENSION, UNSPECIFIED TYPE: ICD-10-CM

## 2025-04-07 DIAGNOSIS — G47.9 DISORDERED SLEEP: ICD-10-CM

## 2025-04-07 DIAGNOSIS — K76.0 FATTY LIVER: ICD-10-CM

## 2025-04-07 DIAGNOSIS — R06.83 SNORING: ICD-10-CM

## 2025-04-07 LAB
VIT B12 SERPL-MCNC: 1111 PG/ML (ref 211–911)
VIT D+METAB SERPL-MCNC: 31.3 NG/ML (ref 30–100)

## 2025-04-07 PROCEDURE — 36415 COLL VENOUS BLD VENIPUNCTURE: CPT

## 2025-04-07 PROCEDURE — 82607 VITAMIN B-12: CPT

## 2025-04-07 PROCEDURE — 82306 VITAMIN D 25 HYDROXY: CPT

## 2025-04-07 RX ORDER — ONDANSETRON 4 MG/1
1 TABLET, FILM COATED ORAL EVERY 8 HOURS PRN
COMMUNITY
End: 2025-04-07

## 2025-04-07 RX ORDER — OXYCODONE HYDROCHLORIDE 5 MG/1
TABLET ORAL
COMMUNITY
End: 2025-04-07

## 2025-04-07 RX ORDER — NITROFURANTOIN 25; 75 MG/1; MG/1
CAPSULE ORAL
COMMUNITY
End: 2025-04-07

## 2025-04-07 RX ORDER — ALBUTEROL SULFATE 90 UG/1
2 INHALANT RESPIRATORY (INHALATION) EVERY 4 HOURS PRN
COMMUNITY

## 2025-04-07 RX ORDER — LUBIPROSTONE 24 UG/1
24 CAPSULE ORAL 2 TIMES DAILY
COMMUNITY

## 2025-04-07 RX ORDER — ACETAMINOPHEN 500 MG
2 TABLET ORAL EVERY 6 HOURS
COMMUNITY
End: 2025-04-07

## 2025-04-07 RX ORDER — TIRZEPATIDE 2.5 MG/.5ML
2.5 INJECTION, SOLUTION SUBCUTANEOUS WEEKLY
Qty: 2 ML | Refills: 0 | Status: SHIPPED | OUTPATIENT
Start: 2025-04-07

## 2025-04-07 RX ORDER — AMOXICILLIN 500 MG/1
4 TABLET, FILM COATED ORAL AS DIRECTED
COMMUNITY
End: 2025-04-07

## 2025-04-07 RX ORDER — PSEUDOEPHEDRINE HCL 30 MG
1 TABLET ORAL 2 TIMES DAILY
COMMUNITY
End: 2025-04-07

## 2025-04-07 RX ORDER — TIRZEPATIDE 5 MG/.5ML
5 INJECTION, SOLUTION SUBCUTANEOUS WEEKLY
Qty: 2 ML | Refills: 2 | Status: SHIPPED | OUTPATIENT
Start: 2025-04-07

## 2025-04-07 RX ORDER — TRAMADOL HYDROCHLORIDE 50 MG/1
1-2 TABLET ORAL EVERY 6 HOURS PRN
COMMUNITY
End: 2025-04-07

## 2025-04-07 RX ORDER — HYDROCODONE BITARTRATE AND ACETAMINOPHEN 5; 325 MG/1; MG/1
1 TABLET ORAL EVERY 6 HOURS PRN
COMMUNITY
End: 2025-04-07

## 2025-04-07 RX ORDER — IBUPROFEN 600 MG/1
1 TABLET, FILM COATED ORAL
COMMUNITY
End: 2025-04-07

## 2025-04-07 RX ORDER — PANTOPRAZOLE SODIUM 40 MG/1
1 TABLET, DELAYED RELEASE ORAL
COMMUNITY
End: 2025-04-07

## 2025-04-07 RX ORDER — CEFADROXIL 500 MG/1
1 CAPSULE ORAL 2 TIMES DAILY
COMMUNITY
End: 2025-04-07

## 2025-04-07 RX ORDER — HYDROCHLOROTHIAZIDE 12.5 MG/1
1 TABLET ORAL DAILY
COMMUNITY
End: 2025-04-07

## 2025-04-07 RX ORDER — METRONIDAZOLE 500 MG/1
1 TABLET ORAL EVERY 12 HOURS
COMMUNITY
End: 2025-04-07

## 2025-04-07 NOTE — PATIENT INSTRUCTIONS
Welcome to the [facility] Program...your Lifestyle Renovation begins now!  Thank you for placing your trust in our health care team, I look forward to working with you along this journey to better health!    Next steps:     1.  Call our office at [phone] to schedule a personal nutrition consultation with one of our registered dieticians, Kt August. Bring along your food journal (3 days minimum). See journal options below.  2.  Complete non fasting labs at [facility] lab site prior to next office visit. Lab results will be communicated via [employer].  3.  Complete additional testing as ordered: Sleep study.  4.  Fill your prescribed medication and take as discussed and prescribed: Start Zepbound at 2.5 mg weekly. After 4 weeks increase to the next dose of 5 mg weekly. If at a weight plateau for >3 weeks, then send [employer] message with current scale weight to determine if a dose adjustment is appropriate. Otherwise plan to maintain this dose until next visit. Any further dose titrations beyond this dose will be considered at appointments only, unless otherwise discussed. Visit the website [email or web address] and click on Consumers for additional details, savings, and further dosing instructions. This medication may require a prior authorization (PA) by your insurance. A PA may take one week plus to complete and our office will be in touch during this process if needed.    Tips while taking an injectable medication:    Be an intuitive eater. Listen to your hunger and fullness signals, stopping when you are full.  Consume protein and produce in your day, striving for a rainbow of color of produce.  Reduce portions to starting size of 1 cup and check in with your gut to see if you are full. Use a sand timer to slow down your eating pace to allow for 15-20 minutes to complete a meal and use the \"2 bite rule\".  Reduce refined sugars and high fat foods, as they may contribute to greater  side effects of nausea and heartburn.  Stop eating 3 hours before bedtime to allow your food to digest.  Remain hydrated with water or non caloric and non caffeine beverages.  Use over the counter adriana lozenge/supplement to help reduce nausea if needed.  If you have been off your medication for more than 2 weeks please notify our office to determine next dosing, as a return to previous dose may not be appropriate or tolerated.    Please try to work on the following dietary changes this first month:    1.  Drink water with meals and throughout the day, cut down on soda and/or juice if consumed. Consider flavored water options like Bubbly, Spindrift, Hint and Brenda. Reduce alcohol servings to 4 per week maximum.  2.  Have protein with each meal, examples include: greek yogurt, cottage cheese, hard boiled egg, tofu, chicken, fish, or tuna.   3.  Work towards reducing/eliminating refined carbohydrates and sugars which includes items such as sweets, as well as rice, pasta, and bread and make sure to choose whole grain options when having them with just 1 serving per meal about the size of your inner palm.  4.  Consume non starchy veggies daily working towards making them a good 50% of your daily food intake. Add them to lunch and dinner consistently.  5.  Start a daily probiotic: VSL#3 is recommended, (order on line at www.vsl3.com). Take 1 capsule daily with water for 30 days, then reduce to 1 every other day (this will reduce the cost). Capsules can be left out of refrigerator for 2 weeks. I recommend using a pill box weekly and keeping the bottle in the fridge.    Please download tatum My Fitness Pal, LoseIt! Or My Net Diary to monitor daily dietary intake and you will be able to see if you are eating the right amount of calories or too much or too little which would hinder weight loss. Additionally this will help to see your daily carbohydrate and protein intake. When you set the tatum up choose 1.5 lbs/week as a  goal.  Keeping a paper food journal is an option as well to remain accountable for your choices- this is the start to mindful eating! A low calorie diet has been consistently shown to support weight loss.    Continue or start exercising to help establish a routine. If not already exercising begin with 1 day/week and progress as able with the goal of working towards 30 minutes 5 days a week at a minimum. A variety or cardio, strength and stretching is important. Review resources below to help support you in building this healthy routine.    Meditation daily can help manage and control stress. Chronic stress can make weight loss difficult.  Exercising is one way to help with stress, but meditation using the CALM Clemente or another comparable alternative can be done in your home or place of work with little time commitment. This Clemente can also help work on behavior change and improve sleep. Check out the segment under Calm Masterclass and listen to The 4 Pillars of Health. A great way to begin learning about the foundation of lifestyle with practical tips to use in your every day. In addition, we offer counseling services and support for individual connection and care. A referral is necessary so please let me know if this is a service you are interested.    Check out www.yourweightmatters.org blog for continued support and education along your weight loss journey to optimal health!      Patient Resources:    Personal Training/Fitness Classes/Health Coaching    [facility] in [place]: Full fitness center with group fitness and personal training located in [place].  Health Coaching with Luisa Guy, Sammy Gallagher, and Alvaro Coker at our [facility]- individual coaching to work on your health goals. Call [phone] and/or email @ [email or web address]. Free 60 minute consult when client of [facility].  LIZABETH Becerra @ [email or web address] Mobile. A variety of group  fitness options plus various yoga classes [phone] and/or email Lyly at [email or web address]  [facility] Fitness Centers with multiple locations: [employer] (www.Momo), F45 Training (www.d72ripauzzd.Swogo), Fit Body Bootcamp (www.Mynglebodybootcamp.Swogo), ThreatStream (www.UMass [place].Swogo), The Exercise  (www.exercisecoach.com), Club Pilates (www.clubpilates.Swogo)    Online Fitness  Fitness  on Splunk  Fit in 10 DVD series   www.qxabz61PWJWidbook  Chair exercises via Sit and Be Fit (www.sitandbefit.org) and Amadesa (www.ThreatStream.com) or Osvaldo Henriquez or Porfirio Carey videos on YouTube.  Hip Hop Fit with Anthony Sanchezks at www.hiphopfit.G-Zero [employer]rapeutics    Apps for on the Go Fitness  [place] 7 Minute Workout (orange box with white 7) - free on the go HIIT training clemente  Peloton Clemente @ www.onepeloton.com    Nutrition Trackers, Meal Preparation, and Other Meal Programs  LoseIT! And My Fitness Pal apps and on line for tracking nutrition  NOOM - virtual health coaching  FitFoundation (healthy meals on the go) in [place] @ www.roffkflavtgln5uWidbook  Kaitlynn SCOTT @ App55 LtdbistrTotal Communicator SolutionsdWidbook and Hvjmiz75 (calorie smart and low carb plans recommended) @ www.dkepcf49.com, Metabolic Meals @ www.GreenlingMetabolicMeals.com - individual prepared meals to go  Gobble, Blue Apron, Home , Every Plate, Sunbasket- on line meal delivery programs for preparation at home  Meal Village in [place] for homemade meals to go @ www.mealvillage.com  Diet Doctor @ www.dietdoctor.com - low carb swaps  ReciMe and Mealime clemente (grocery and meal planning)    Stress, Anxiety, Depression, Trauma  CALM meditation clemente (www.calm.com)  Headspace  Don't let anxiety run your life. Using the science of emotion regulation and mindfulness to overcome fear and worry by Carlos Myles PsyD and Jose Robertson MA.  The Cognitive Code Podcast (September 27, 2023): 6 Magic Words That Stop Anxiety  What Happened to You?- a look at the  impact trauma has on behavior written by Scott Palacios and Dr. Shekhar Pearl  Whole Again by Adam Mena - discovering your true self after trauma    Mindful Eating/The Hungry Brain  Am I Hungry? Mindful eating virtual  tatum (www.amihungry.com)  The Hungry Brain by Alice Erazo, PhD  Mindless Eating by Dallas Otto  Weight Loss Surgery Will Not Treat Food Addiction by Shagufta Wolfe Ph.D    Metabolic Dysfunction, Hormones and Cravings  Why We Get Sick? By Donnie Merchant (insulin resistance)  Your Body in Balance: The New Science of Food, Hormones, and Health by Dr. Tripp Zhao  The Complete Guide to fasting by Dr. Ray  Fast Like a Girl by Dr. Barbi Pang  The M Factor (documentary on PBS about Menopause)  Sugar, Salt & Fat by Thu Zacarias, Ph.D, R.D.  The Truth About Sugar - documentary on sugar (Free on [employer], https://youSimpleRegistryu.be/4H1dhlhCY1r)  Presentation on SUGAR called Sugar: The Bitter Truth by Dr. Chidi Marie ([employer]) https://Forward Financial Technologiesu.be/dBnniua6-oM?si=mvgui4pai0kg4nsz  Reverse Visceral Fat: #1 Way to Increase Your Lifespan & End Inflammation with Dr. Pradeep Stover on Utube @ https://Forward Financial Technologiesu.be/nupPRnvUpJY?si=nf9zvmVlXAM7FszV    Nutrition Support  You Are What You Eat - Netfix series on twin study looking at impact of nutrition changes on health  The End of Dieting: How to Live for Life by Dr. Bairon Howe M.D. or listen to The Pearlfection Podcast Episode 63: Understanding \"Nutritarian\" Eating w/Dr. Bairon Howe  The Game Changers- Netflix Documentary on plant based nutrition  The Dr. Holt T5 Wellness Plan by Dr. Albino Holt MD  The Complete Guide to fasting by Dr. Ray  @[email or web address] PharmaBea[facility] (Instagram Dietician with support surrounding nutrition and meal prep/planning)    Education, Motivation and Support Resources  Live to 100: Secrets of the Blue Zones - Netflix series on the secrets to communities living over 100 years old  Atomic Habits by Sunil Davalos (a book about taking small steps to promote  greater behavior change)   Motivation tatum (black box with white \")- daily supportive messages sent to your phone  Can't Hurt Me by Carlos Matt (a book exploring the power of discipline in achieving your goals)  Fed Up - documentary about obesity (Free on Utube)  Www.yourweightmatters.org - Obesity Action Coalition sponsored Blog posts  Obesity Action Coalition Resources on topics specific to weight management (www.obesityaction.org)  Fitlosophy Fitspiration - journal to better health (journal book found at [employer] in fitness aisle)  Tulio Hernandez talk titled: The Call to Courage (Netflix)  The Exam Room by the Physician's Committee (Podcast)  Nutrition Facts by Dr. Madsen (Podcast)    Balanced Nutrition includes:     Build the mentality of Food 4 Fuel. Clean eating with whole foods and eliminating/reducing ultra processed foods.  Be an intuitive eater and using mindful eating practices.  Eat a balanced plate with protein and produce at all meals: 1/4 plate- protein, 1/2 plate non starchy veggies, and 1/4 plate fruit or complex carbohydrate.  Drink water with all meals and use a salad plate to naturally reduce portions.  Eliminate/reduce late night eating by stopping after 7pm. Allowing your body to fast for 12 hours (drink only water, tea or black coffee without any additives).            Healthy Tips for Eating Out  Choices  It’s not easy to change the habits of a lifetime. Experts say that it can take 6 months just to change one old habit for a healthier one. That’s why gradual change is so important. These tips are reminders of the dozens of small ways you can change your habits when eating out. Don’t expect to follow each tip all the time. Think of the tips as options for handling situations that may have given you trouble in the past.  You don't have to give up eating out to cut down on fat, cholesterol, and salt. You just need to think about what you order. Many menus highlight low-fat and low-sodium dishes.  But if you can't find what you want, ask. Explain what you need to the  or . Or ask to see printed nutrition information.    Ask for what you want  Ask that foods be prepared with little or no fat and with no added salt.  Ask that sauces be left off or served on the side. Choose sauces made with tomato instead of with cream or cheese.  Ask for steamed rice or a baked or boiled potato, without butter or sour cream.  Ask that vegetables be steamed and served with no butter or sauce. Ask for lemon juice or vinegar to sprinkle on them for flavor.  Keep these tips in mind  Choose minestrone or vegetable soups. Ask about sodium content.  Order salad dressing on the side. Dip your fork in the dressing, then in the salad.  Look for fish, chicken, turkey, or meat that is broiled, roasted, poached, or steamed.  Order 1 or 2 low-fat appetizers or soup and a salad instead of a main dish. Or eat only half of the main dish and take the rest home.  If you want a dessert, try fresh fruit, nonfat yogurt, or sorbet. Or share a dessert.  Fast food tips  Choose grilled chicken sandwiches and plain hamburgers. Add vegetables to your burgers and sandwiches and go lightly on creamy sauces like mayonnaise and tartar sauce. Choose whole-grain buns or bread when available.  Remove the skin from fried chicken and choose mashed potatoes, corn on the cob, and baked beans on the side.  Order a broiled chicken salad and pile on fresh vegetables from the salad bar. Use a small amount of low-fat dressing.  Top a baked potato with cottage cheese and vegetables from the salad bar.  Ask for a pizza topped with vegetables.  Watch portion size. Order regular size meals, not super-sized. A kid's meal may be enough and may have healthier options for sides.  Stay away from milkshakes, sugary sodas, and sweetened drinks. Instead choose low-fat or skim milk, water, unsweetened ice tea, and sparkling water.  Restaurant tips  Plan ahead. Decide  what you will eat before you get to the restaurant. If you plan to eat high-fat foods, choose low-fat foods during the rest of the day.  Don’t be afraid to ask how foods are prepared and whether they can be done without high-fat ingredients, such as cream, butter, cheese, and oil.  Ask for sauces and salad dressings on the side and use just a tablespoon. Ask for low-fat dressings.  Try starting your meal with a bowl of vegetable soup or a salad. This may help to prevent you from overeating during the meal.  Order meat, poultry, and fish broiled, grilled, baked, poached, or steamed. Always remove the skin from chicken.  Choose Mexican dishes made with soft, rather than crispy tortillas. For toppings, use salsa and lettuce, rather than sour cream and cheese.  For dessert, enjoy fruit, sorbet, or low-fat frozen yogurt. Share a rich dessert with friends.  Make a meal out of a low-fat appetizer (such as shrimp cocktail or fresh pasta), bread, and salad as your main meal. Ask for an appetizer-size portion of an entree.    Foods to avoid  Donuts, muffins, and pastries  Coconut, vegetables with butter, cream, or cheese sauce  Cream, whole milk, and powdered creamers  Vu, liver, luncheon meats, ground meat, and canned fish in oil  Sweets and foods made with butter, coconut or palm oil, or hydrogenated fats    © 8813-7022 [employer], [employer]. [address] [place] [id number]. All rights reserved. This information is not intended as a substitute for professional medical care. Always follow your healthcare professional's instructions.      Why Do We Experience Hunger and Have Cravings?  December 18, 2020  Posted in Blog, Nutrition  By Your Weight Matters Campaign    Hunger and cravings  Hunger and cravings are two key factors that drive our decision to eat and influence our food choices. They are feelings and experiences that we get as a result of the complex signaling between our stomach and  brain:    Gut-brain hunger signals (when your stomach tells your brain that you are full or hungry)  The brain reward process (feeling pleasure or silke from food)  Cognitive control processes (conscious thoughts, feelings, mindset, decisions, etc.)    The Role of Biology  Many people who struggle with weight have biological factors at play that can affect their relationship with food and food-related decisions. No two people experience hunger and respond to it the same way. It is important that the role of biology in hunger is more widely recognized to help others understand how complicated weight issues can be.    Gut-Brain Hunger Signals  Why do we get hungry? Your digestive tract has receptors that can detect when nutrients are present. When you don’t have enough nutrients, these receptors tell your stomach to produce hunger hormones that send a signal to your hypothalamus - the part of your brain that controls hunger and thirst. As a result, you get hungry and want to eat.    When we gain weight, our brains become less sensitive to signals that control how responsive our brain is to nutrients. For example, someone at a higher weight may not have as much leptin (the fullness hormone) and is more likely to feel hungry. That’s why some people affected by obesity may be more biologically likely to struggle with overeating.    Food is Rewarding  Another key element of hunger is psychology. Food is very rewarding and its effects on the brain can make it hard to resist temptation when environmental cues are everywhere. This can also cause you to eat even when you’re not hungry.    Here are two key parts of the brain’s reward system:    The Endocannabinoid System - A system of signaling molecules whose main function is to help the body maintain homeostasis (a stable internal state). It affects mood, appetite, stress, sleep and more.    The Opioid System - A system of the brain that controls pain, reward and addicting  behaviors (like stress-eating or binge eating).  Both of these systems can have powerful effects on your desire for food, your cravings, and your enjoyment of food. When we eat foods that are high in fat, sugar and salt, these systems increase activity in the brain that ramps up your desire to eat.    Cognitive Control  The third key element of hunger and cravings involve your conscious control. Cognitive control helps you decide when not to respond to food cues and hunger signals. However, it can be tough to manage when you are really hungry, stressed or upset in any way.    When we are in a better mood and not starving, it’s easier to ignore food cues and cravings. When we are stressed or depressed, we are more likely to eat based on our feelings. This is called emotional eating or stress eating.    Managing Hunger and Cravings  So, what does all this mean and how can you manage hunger and cravings? Check out the full article (https://www.obesityaction.org/community/hunger-why-do-we-have-cravings-and-what-can-lv-rw-zwpnq-them/) from the Obesity Action Coalition,  of the Your Weight Matters Campaign.

## 2025-04-08 ENCOUNTER — TELEPHONE (OUTPATIENT)
Dept: INTERNAL MEDICINE CLINIC | Facility: CLINIC | Age: 50
End: 2025-04-08

## 2025-04-08 NOTE — TELEPHONE ENCOUNTER
[facility] faxed request to do pa for zepbound 2.5 mg  Completed in epic today  Pending approval or denial  Attached one and only visit note.

## 2025-04-10 NOTE — TELEPHONE ENCOUNTER
Zepbound 2.5 mg approved in epic  Prior Authorization History  Tirzepatide-Weight Management (ZEPBOUND) 2.5 MG/0.5ML Subcutaneous Solution Auto-injector     Approval Details    Authorized from March 9, 2025 to December 4, 2025  Information received electronically from payer

## 2025-05-16 ENCOUNTER — OFFICE VISIT (OUTPATIENT)
Dept: INTERNAL MEDICINE CLINIC | Facility: CLINIC | Age: 50
End: 2025-05-16
Payer: COMMERCIAL

## 2025-05-16 VITALS — HEIGHT: 69 IN | BODY MASS INDEX: 38.31 KG/M2 | WEIGHT: 258.63 LBS

## 2025-05-16 DIAGNOSIS — M15.9 OSTEOARTHRITIS OF MULTIPLE JOINTS, UNSPECIFIED OSTEOARTHRITIS TYPE: ICD-10-CM

## 2025-05-16 DIAGNOSIS — K76.0 FATTY LIVER: ICD-10-CM

## 2025-05-16 DIAGNOSIS — E66.01 CLASS 2 SEVERE OBESITY WITH SERIOUS COMORBIDITY AND BODY MASS INDEX (BMI) OF 39.0 TO 39.9 IN ADULT, UNSPECIFIED OBESITY TYPE (HCC): Primary | ICD-10-CM

## 2025-05-16 DIAGNOSIS — E66.812 CLASS 2 SEVERE OBESITY WITH SERIOUS COMORBIDITY AND BODY MASS INDEX (BMI) OF 39.0 TO 39.9 IN ADULT, UNSPECIFIED OBESITY TYPE (HCC): Primary | ICD-10-CM

## 2025-05-16 DIAGNOSIS — I10 HYPERTENSION, UNSPECIFIED TYPE: ICD-10-CM

## 2025-05-16 PROCEDURE — 97802 MEDICAL NUTRITION INDIV IN: CPT

## 2025-05-16 NOTE — PROGRESS NOTES
INITIAL OUTPATIENT NUTRITION CONSULTATION        Nutrition Assessment    Medical Diagnosis: Obesity, Hypertension, and fatty liver, OA joints, chronic constipatiom    Physical Findings: fatigue, joint pain, and gets 4 hours interrupted sleep per day    Client Age and Gender: 50 year old female    Marital Status and Occupation: Lives with 3 kids.  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      Labs:   No components found for: \"HGBA1C\"  No results found for: \"TRIG\"  No results found for: \"LDL\"  No results found for: \"HDL\"  No results found for: \"AST\"  No results found for: \"ALT\"      Height:  Ht Readings from Last 1 Encounters:   04/07/25 5' 9\" (1.753 m)       Weight:   Wt Readings from Last 2 Encounters:   04/07/25 266 lb   11/13/24 251 lb       BMI Readings from Last 1 Encounters:   04/07/25 39.28 kg/m²       Weight change: Decrease of 7.4 lbs in the past month    Goal weight/Health status:188 lbs,  lbs and maintain    Diet/Weight History: Per pt with excess weight beginning about 15 yrs ago after last pregnancy.  Has a huge sweet tooth, gets some exercise in at work.  Per pt has tried increased fiber, cut down red meat. When feels bloated with IBS, eats less and drinks more water.  Loves fruits and vegetables.    Current Diet: calorie reduction    Food/Beverage Intake:     Breakfast: 7:30 am-turkey ofelia Sudanese Starbucks or [employer]era spinach artichoke souffle or cranberry walnut criossant sandwich half and fruit  Snack: 10 am- sleep break for 1 hour  Lunch:   Snack:4:30 skip or tropical smoothie or fruit  Dinner: 5-5:30 pm-salad and blue cheese crumbles beets with poppy seed dressing or soup  Snack: skips  Beverages: water 64 oz /day with liquid iv and regular coffee black 2-3 cups/day     Vitamins/mineral supplements vit D    Meal pattern: 2 meals/d, 2 snacks/d    Number of meals/week eaten at restaurants: 3-4x/week        Alcohol Intake: 1 drink once per month    Estimated  current caloric intake: 2660 cals/d    Estimated caloric needs for weight loss: 2660-500= 2160 cals/d for 1 pounds/week weight loss    Physical Activity: walks at least 30 minutes per day 5-6 days per week and mows lawn on weekends     Food Journal: no, used MFP in the past     Spent 60 minutes in consultation with the patient.      Nutrition Intervention/Education:  Comprehensive nutrition education and evaluation provided for weight loss. Met with pt for initial visit. Pt is being followed by Anyi Gregorio for medical weight management.  Pt is taking zepbound and feels it helps to curb hunger and cravings.   Per pt with excess weight beginning about 15 yrs ago after last pregnancy.  Has a huge sweet tooth, gets some exercise in at work.  Per pt has tried increased fiber, cut down red meat. When feels bloated with IBS, eats less and drinks more water.  Loves fruits and vegetables.  Per diet recall, suspect current diet is low in protein and higher in carbs.   Discussed benefits of including protein and produce with meals and snacks.  Provided guidance, ideas and recipe for including protein with produce in diet.  Pt verbalized understanding.  Per pt is drinking ~ 64 oz of water per day. Discussed ideas for increasing hydration in diet.  Pt verbalized understanding.  Per pt is exercising regularly by walking, mowing lawn with not current strength training.  Discussed ideas to increase exercise in small increments.  Pt verbalized understanding.  Patient agreed to goals below.    Goals:     1.  Keep a food record, My Net Diary, select the macros dashboard.  2.  Strive to consume at least 4-6 meals/snacks per day.  Include protein and produce when you eat.  Aim  for  grams of protein per day.  Try to keep the carbohydrates at 100-120 grams per day or less.    3.  Practice some mindful eating strategies, chew food 20-30 times before swallowing, eat food slowly, use smaller plates/bowls/utensils.  Make the meals  last 30 minutes.  4.  Aim for 64 oz per day of water.  (Try Protein 2 O water, adding True Lemon, Crystal Light, use a measured container).  5.  Taper caffeine.  6.  Exercise with a goal of 30 minutes per day for exercise (for example,walking). (I burn 8.6 calories per minute of walking).   7.  Strength training 10 minutes 3 days per week.  (7 minute workout song on You Tube) or (Gym Rat no more-18 at home exercises)     Monitoring/Evaluation:  Follow up appt scheduled with dietitian          Mariangel Roman RD, LDN

## 2025-05-23 ENCOUNTER — OFFICE VISIT (OUTPATIENT)
Dept: SLEEP CENTER | Age: 50
End: 2025-05-23
Attending: Other
Payer: COMMERCIAL

## 2025-05-23 DIAGNOSIS — G47.9 DISORDERED SLEEP: ICD-10-CM

## 2025-05-23 DIAGNOSIS — M15.9 OSTEOARTHRITIS OF MULTIPLE JOINTS, UNSPECIFIED OSTEOARTHRITIS TYPE: ICD-10-CM

## 2025-05-23 DIAGNOSIS — Z51.81 ENCOUNTER FOR THERAPEUTIC DRUG MONITORING: Primary | ICD-10-CM

## 2025-05-23 DIAGNOSIS — K76.0 FATTY LIVER: ICD-10-CM

## 2025-05-23 DIAGNOSIS — R06.83 SNORING: ICD-10-CM

## 2025-05-23 DIAGNOSIS — I10 HYPERTENSION, UNSPECIFIED TYPE: ICD-10-CM

## 2025-05-23 DIAGNOSIS — E66.812 CLASS 2 SEVERE OBESITY WITH SERIOUS COMORBIDITY AND BODY MASS INDEX (BMI) OF 39.0 TO 39.9 IN ADULT, UNSPECIFIED OBESITY TYPE (HCC): ICD-10-CM

## 2025-05-23 DIAGNOSIS — E66.01 CLASS 2 SEVERE OBESITY WITH SERIOUS COMORBIDITY AND BODY MASS INDEX (BMI) OF 39.0 TO 39.9 IN ADULT, UNSPECIFIED OBESITY TYPE (HCC): ICD-10-CM

## 2025-05-23 DIAGNOSIS — G43.909 MIGRAINE WITHOUT STATUS MIGRAINOSUS, NOT INTRACTABLE, UNSPECIFIED MIGRAINE TYPE: ICD-10-CM

## 2025-05-23 PROCEDURE — 95806 SLEEP STUDY UNATT&RESP EFFT: CPT

## 2025-06-06 ENCOUNTER — OFFICE VISIT (OUTPATIENT)
Dept: SLEEP CENTER | Age: 50
End: 2025-06-06
Attending: Other
Payer: COMMERCIAL

## 2025-06-06 PROCEDURE — 95806 SLEEP STUDY UNATT&RESP EFFT: CPT

## 2025-06-10 ENCOUNTER — SLEEP STUDY (OUTPATIENT)
Facility: CLINIC | Age: 50
End: 2025-06-10
Payer: COMMERCIAL

## 2025-06-10 DIAGNOSIS — G47.9 SLEEP DISORDER: Primary | ICD-10-CM

## 2025-06-10 PROCEDURE — 95806 SLEEP STUDY UNATT&RESP EFFT: CPT | Performed by: OTHER

## 2025-06-18 ENCOUNTER — PATIENT MESSAGE (OUTPATIENT)
Dept: INTERNAL MEDICINE CLINIC | Facility: CLINIC | Age: 50
End: 2025-06-18

## 2025-07-24 DIAGNOSIS — E66.812 CLASS 2 SEVERE OBESITY WITH SERIOUS COMORBIDITY AND BODY MASS INDEX (BMI) OF 39.0 TO 39.9 IN ADULT, UNSPECIFIED OBESITY TYPE (HCC): ICD-10-CM

## 2025-07-24 DIAGNOSIS — E66.01 CLASS 2 SEVERE OBESITY WITH SERIOUS COMORBIDITY AND BODY MASS INDEX (BMI) OF 39.0 TO 39.9 IN ADULT, UNSPECIFIED OBESITY TYPE (HCC): ICD-10-CM

## 2025-07-24 DIAGNOSIS — Z51.81 ENCOUNTER FOR THERAPEUTIC DRUG MONITORING: ICD-10-CM

## 2025-07-24 DIAGNOSIS — I10 HYPERTENSION, UNSPECIFIED TYPE: ICD-10-CM

## 2025-07-24 DIAGNOSIS — K76.0 FATTY LIVER: ICD-10-CM

## 2025-07-24 NOTE — TELEPHONE ENCOUNTER
Requesting zepbound 5 mg  LOV: 4/7/25  RTC: 4 months  Filled: 4/8/25 #2ml months with 2 refills    Future Appointments   Date Time Provider Department Center   8/26/2025 11:00 AM Mariangel Roman RD [id number] [address] [facility]   8/26/2025  2:20 PM Anyi Gregorio APRN [facility] [address]   11/24/2025  2:00 PM Anyi Gregorio APRN [id number]

## 2025-07-27 RX ORDER — TIRZEPATIDE 5 MG/.5ML
5 INJECTION, SOLUTION SUBCUTANEOUS WEEKLY
Qty: 2 ML | Refills: 1 | Status: SHIPPED | OUTPATIENT
Start: 2025-07-27

## 2025-08-26 ENCOUNTER — TELEMEDICINE (OUTPATIENT)
Dept: INTERNAL MEDICINE CLINIC | Facility: CLINIC | Age: 50
End: 2025-08-26

## 2025-08-26 ENCOUNTER — OFFICE VISIT (OUTPATIENT)
Dept: SURGERY | Facility: CLINIC | Age: 50
End: 2025-08-26

## 2025-08-26 VITALS — HEIGHT: 69 IN | BODY MASS INDEX: 35.81 KG/M2 | WEIGHT: 241.81 LBS

## 2025-08-26 DIAGNOSIS — E66.01 CLASS 2 SEVERE OBESITY WITH SERIOUS COMORBIDITY AND BODY MASS INDEX (BMI) OF 39.0 TO 39.9 IN ADULT, UNSPECIFIED OBESITY TYPE (HCC): ICD-10-CM

## 2025-08-26 DIAGNOSIS — Z51.81 ENCOUNTER FOR THERAPEUTIC DRUG MONITORING: Primary | ICD-10-CM

## 2025-08-26 DIAGNOSIS — K59.09 CHRONIC CONSTIPATION: ICD-10-CM

## 2025-08-26 DIAGNOSIS — M15.9 OSTEOARTHRITIS OF MULTIPLE JOINTS, UNSPECIFIED OSTEOARTHRITIS TYPE: ICD-10-CM

## 2025-08-26 DIAGNOSIS — E66.812 CLASS 2 SEVERE OBESITY WITH SERIOUS COMORBIDITY AND BODY MASS INDEX (BMI) OF 39.0 TO 39.9 IN ADULT, UNSPECIFIED OBESITY TYPE (HCC): ICD-10-CM

## 2025-08-26 DIAGNOSIS — E66.812 CLASS 2 OBESITY WITH BODY MASS INDEX (BMI) OF 35.0 TO 35.9 IN ADULT, UNSPECIFIED OBESITY TYPE, UNSPECIFIED WHETHER SERIOUS COMORBIDITY PRESENT: Primary | ICD-10-CM

## 2025-08-26 DIAGNOSIS — G47.33 OSA (OBSTRUCTIVE SLEEP APNEA): ICD-10-CM

## 2025-08-26 DIAGNOSIS — K76.0 FATTY LIVER: ICD-10-CM

## 2025-08-26 DIAGNOSIS — I10 HYPERTENSION, UNSPECIFIED TYPE: ICD-10-CM

## 2025-08-26 PROCEDURE — 98006 SYNCH AUDIO-VIDEO EST MOD 30: CPT | Performed by: NURSE PRACTITIONER

## 2025-08-26 PROCEDURE — 97803 MED NUTRITION INDIV SUBSEQ: CPT

## 2025-08-26 PROCEDURE — 0358T BIA WHOLE BODY: CPT

## 2025-08-26 RX ORDER — METFORMIN HYDROCHLORIDE 500 MG/1
2000 TABLET, EXTENDED RELEASE ORAL
Qty: 120 TABLET | Refills: 3 | Status: SHIPPED | OUTPATIENT
Start: 2025-08-26

## (undated) NOTE — LETTER
10/08/24          Octavia F Bronson  :  1975      To Whom It May Concern:    This patient was seen in our neurology office on 10/03/24 . This patient may return to work up to 8 hours per day, 3 days per week, effective immediately.     If this office may be of further assistance, please do not hesitate to contact us.      Sincerely,        Sabino Jenkins MD, FAAN, MARY JANE [facility] [place]

## (undated) NOTE — LETTER
10/08/24          Octavia Dobson  :  1975      To Whom It May Concern:    This patient was seen in our neurology office on 10/03/24 . This patient may return to work up to 8 hours per day, effective immediately.     If this office may be of further assistance, please do not hesitate to contact us.      Sincerely,        Sabino Jenkins MD, FAAN, MARY JANE  [facility] [place]